# Patient Record
Sex: MALE | Race: WHITE | Employment: FULL TIME | URBAN - METROPOLITAN AREA
[De-identification: names, ages, dates, MRNs, and addresses within clinical notes are randomized per-mention and may not be internally consistent; named-entity substitution may affect disease eponyms.]

---

## 2023-09-08 ENCOUNTER — APPOINTMENT (OUTPATIENT)
Dept: GENERAL RADIOLOGY | Facility: HOSPITAL | Age: 28
End: 2023-09-08
Attending: EMERGENCY MEDICINE

## 2023-09-08 ENCOUNTER — HOSPITAL ENCOUNTER (EMERGENCY)
Facility: HOSPITAL | Age: 28
Discharge: HOME | End: 2023-09-08
Attending: EMERGENCY MEDICINE

## 2023-09-08 VITALS
RESPIRATION RATE: 18 BRPM | SYSTOLIC BLOOD PRESSURE: 152 MMHG | DIASTOLIC BLOOD PRESSURE: 89 MMHG | BODY MASS INDEX: 26.79 KG/M2 | OXYGEN SATURATION: 99 % | HEIGHT: 63.78 IN | HEART RATE: 78 BPM | WEIGHT: 155 LBS | TEMPERATURE: 98 F

## 2023-09-08 DIAGNOSIS — S92.315A NONDISPLACED FRACTURE OF FIRST METATARSAL BONE, LEFT FOOT, INITIAL ENCOUNTER FOR CLOSED FRACTURE: Primary | ICD-10-CM

## 2023-09-08 PROCEDURE — 73630 X-RAY EXAM OF FOOT: CPT | Performed by: EMERGENCY MEDICINE

## 2023-09-08 PROCEDURE — 73610 X-RAY EXAM OF ANKLE: CPT | Performed by: EMERGENCY MEDICINE

## 2023-09-08 PROCEDURE — 28470 CLTX METATARSAL FX WO MNP EA: CPT

## 2023-09-08 PROCEDURE — 99284 EMERGENCY DEPT VISIT MOD MDM: CPT

## 2023-09-08 RX ORDER — ACETAMINOPHEN 325 MG/1
650 TABLET ORAL ONCE
Status: COMPLETED | OUTPATIENT
Start: 2023-09-08 | End: 2023-09-08

## 2023-09-08 RX ORDER — IBUPROFEN 600 MG/1
600 TABLET ORAL ONCE
Status: COMPLETED | OUTPATIENT
Start: 2023-09-08 | End: 2023-09-08

## 2023-09-08 NOTE — DISCHARGE INSTRUCTIONS
Please call the orthopedist listed above to make an appointment as soon as possible for follow-up. Use the splint and crutches at all times throughout the day. Please elevate the leg as much as possible while you are sitting down or sleeping. Please take acetaminophen (also called Tylenol) for your pain. You can take 1 g every 4-6 hours. Do not take more than 3 g over the course of a day from all medications you take; it is a common ingredient and other medications (such as Norco, Vicodin, Tylenol 3, etc.), so please read the labels carefully. If you are only taking Tylenol, doses every 6 hours. If you have a history of liver disease you should not take Tylenol as it can worsen your liver function. As discussed, you should take ibuprofen (also called Advil or Motrin), or naproxen (also called Aleve) for your pain. If you are taking ibuprofen, you can take 600 mg every 6 hours, or 800 mg every 8 hours. If you are taking naproxen, you can take 500 mg every 12 hours. Do not take more than this amount as it can cause kidney problems or bleeding into your stomach. Do not take these medications at the same time as it can increase your risk for the side effects. If you have a history of heart problems or have had uncontrolled blood pressure for a significant period of time, he should not take these medications as it can increase your risk for heart attack or stroke.
